# Patient Record
Sex: MALE | URBAN - METROPOLITAN AREA
[De-identification: names, ages, dates, MRNs, and addresses within clinical notes are randomized per-mention and may not be internally consistent; named-entity substitution may affect disease eponyms.]

---

## 2021-08-28 ENCOUNTER — NURSE TRIAGE (OUTPATIENT)
Dept: NURSING | Facility: CLINIC | Age: 86
End: 2021-08-28

## 2021-08-28 NOTE — TELEPHONE ENCOUNTER
"Patient calling stating he had 3 lower teeth pulled 8/24/21 Tuesday at [facility].  Patient developed new increased pain starting yesterday in lower jaw. Stating the pain did improve with 2 ES Tylenol that he took 3 hours ago. Pain rating now is \"2-3\" on 1-10 pain scale.   Denies bleeding or swelling. Afebrile.     Disposition to call Dental Office when open. Patient prefers to speak with on call Oral Resident now.    Paged On Call Oral Resident through [facility] Page  at 958 a.m. to call patient at .      Jacqui Brothers RN  [facility]      COVID 19 Nurse Triage Plan/Patient Instructions    Please be aware that novel coronavirus (COVID-19) may be circulating in the community. If you develop symptoms such as fever, cough, or SOB or if you have concerns about the presence of another infection including coronavirus (COVID-19), please contact your health care provider or visit [email or web address].     Disposition/Instructions    Home care recommended. Follow home care protocol based instructions.    Thank you for taking steps to prevent the spread of this virus.  o Limit your contact with others.  o Wear a simple mask to cover your cough.  o Wash your hands well and often.    Resources    [facility]: About COVID-19: [email or web address]    CDC: What to Do If You're Sick: www.cdc.gov/coronavirus/2019-ncov/about/steps-when-sick.html    CDC: Ending Home Isolation: www.cdc.gov/coronavirus/2019-ncov/hcp/disposition-in-home-patients.html     CDC: Caring for Someone: www.cdc.gov/coronavirus/2019-ncov/if-you-are-sick/care-for-someone.html     [facility]: Interim Guidance for Hospital Discharge to Home: www.health.[facility].[place]/diseases/coronavirus/hcp/hospdischarge.pdf    [facility] clinical trials (COVID-19 research studies): [email or web address].[facility]/umn-clinical-trials     Below are the COVID-19 hotlines at the [place] " Department of Health ([facility]). Interpreters are available.   o For health questions: Call 029-751-7168 or 1-740.473.5459 (7 a.m. to 7 p.m.)  o For questions about schools and childcare: Call 875-554-0487 or 1-181.502.2390 (7 a.m. to 7 p.m.)                       Reason for Disposition    [1] Tooth extraction > 3 days ago AND [2] pain not improving    Additional Information    Negative: Sounds like a life-threatening emergency to the triager    Negative: Tooth knocked out    Negative: [1] Bleeding present > 30 minutes AND [2] using correct technique of direct pressure    Negative: [1] Bleeding now AND [2] second call after being instructed in correct technique of direct pressure    Negative: [1] Has packing sutured over socket (extraction site) AND [2] now bleeding around the packing (Exception: few drops or ooze)    Negative: Tongue is very swollen and tender    Negative: [1] Face is swollen AND [2] fever    Negative: Patient sounds very sick or weak to the triager    Negative: [1] SEVERE pain (e.g., excruciating, unable to do any normal activities) AND [2] not improved 2 hours after pain medicine    Negative: Face is very swollen    Negative: Fever    Negative: [1] Caller has URGENT question AND [2] triager unable to answer question    Negative: [1] SEVERE pain (e.g., excruciating, pain scale 8-10) AND [2] begins or increases > 2 days (48 hours) after tooth was removed    Negative: [1] Foul taste or odor in mouth AND [2] begins or increases > 2 days (48 hours) after tooth was removed    Negative: [1] Bleeding off and on AND [2] persists > 1 day (24 hours) after tooth was removed    Negative: [1] Face is swollen AND [2] skin is red or tender to touch    Negative: [1] Face is swollen AND [2] begins or increases > 2 days (48 hours) after tooth was removed    Negative: [1] Caller has NON-URGENT question AND [2] triager unable to answer question    Protocols used: TOOTH [id number]